# Patient Record
Sex: FEMALE | Race: WHITE | NOT HISPANIC OR LATINO | URBAN - METROPOLITAN AREA
[De-identification: names, ages, dates, MRNs, and addresses within clinical notes are randomized per-mention and may not be internally consistent; named-entity substitution may affect disease eponyms.]

---

## 2024-08-30 ENCOUNTER — HOSPITAL ENCOUNTER (EMERGENCY)
Facility: HOSPITAL | Age: 69
Discharge: HOME | End: 2024-08-30
Attending: EMERGENCY MEDICINE
Payer: COMMERCIAL

## 2024-08-30 ENCOUNTER — APPOINTMENT (OUTPATIENT)
Dept: CARDIOLOGY | Facility: HOSPITAL | Age: 69
End: 2024-08-30
Payer: COMMERCIAL

## 2024-08-30 ENCOUNTER — APPOINTMENT (OUTPATIENT)
Dept: RADIOLOGY | Facility: HOSPITAL | Age: 69
End: 2024-08-30
Payer: COMMERCIAL

## 2024-08-30 VITALS
WEIGHT: 160 LBS | DIASTOLIC BLOOD PRESSURE: 72 MMHG | TEMPERATURE: 98.7 F | HEART RATE: 56 BPM | OXYGEN SATURATION: 93 % | BODY MASS INDEX: 26.66 KG/M2 | RESPIRATION RATE: 15 BRPM | HEIGHT: 65 IN | SYSTOLIC BLOOD PRESSURE: 125 MMHG

## 2024-08-30 DIAGNOSIS — R07.9 CHEST PAIN, UNSPECIFIED TYPE: Primary | ICD-10-CM

## 2024-08-30 DIAGNOSIS — R05.1 ACUTE COUGH: ICD-10-CM

## 2024-08-30 DIAGNOSIS — U07.1 COVID-19: ICD-10-CM

## 2024-08-30 LAB
ALBUMIN SERPL-MCNC: 4 G/DL (ref 3.5–5)
ALP BLD-CCNC: 76 U/L (ref 35–125)
ALT SERPL-CCNC: 22 U/L (ref 5–40)
ANION GAP SERPL CALC-SCNC: 10 MMOL/L
AST SERPL-CCNC: 22 U/L (ref 5–40)
BASOPHILS # BLD MANUAL: 0.03 X10*3/UL (ref 0–0.1)
BASOPHILS NFR BLD MANUAL: 1 %
BILIRUB SERPL-MCNC: 0.7 MG/DL (ref 0.1–1.2)
BUN SERPL-MCNC: 18 MG/DL (ref 8–25)
CALCIUM SERPL-MCNC: 8.7 MG/DL (ref 8.5–10.4)
CHLORIDE SERPL-SCNC: 105 MMOL/L (ref 97–107)
CO2 SERPL-SCNC: 25 MMOL/L (ref 24–31)
CREAT SERPL-MCNC: 0.9 MG/DL (ref 0.4–1.6)
EGFRCR SERPLBLD CKD-EPI 2021: 69 ML/MIN/1.73M*2
EOSINOPHIL # BLD MANUAL: 0.13 X10*3/UL (ref 0–0.7)
EOSINOPHIL NFR BLD MANUAL: 4 %
ERYTHROCYTE [DISTWIDTH] IN BLOOD BY AUTOMATED COUNT: 12.2 % (ref 11.5–14.5)
GLUCOSE SERPL-MCNC: 87 MG/DL (ref 65–99)
HCT VFR BLD AUTO: 38.1 % (ref 36–46)
HGB BLD-MCNC: 12.3 G/DL (ref 12–16)
IMM GRANULOCYTES # BLD AUTO: 0 X10*3/UL (ref 0–0.7)
IMM GRANULOCYTES NFR BLD AUTO: 0 % (ref 0–0.9)
LYMPHOCYTES # BLD MANUAL: 1.6 X10*3/UL (ref 1.2–4.8)
LYMPHOCYTES NFR BLD MANUAL: 50 %
MCH RBC QN AUTO: 30.6 PG (ref 26–34)
MCHC RBC AUTO-ENTMCNC: 32.3 G/DL (ref 32–36)
MCV RBC AUTO: 95 FL (ref 80–100)
MONOCYTES # BLD MANUAL: 0.03 X10*3/UL (ref 0.1–1)
MONOCYTES NFR BLD MANUAL: 1 %
NEUTS SEG # BLD MANUAL: 0.96 X10*3/UL (ref 1.2–7)
NEUTS SEG NFR BLD MANUAL: 30 %
NRBC BLD-RTO: 0 /100 WBCS (ref 0–0)
NT-PROBNP SERPL-MCNC: 198 PG/ML (ref 0–353)
PLATELET # BLD AUTO: 263 X10*3/UL (ref 150–450)
POTASSIUM SERPL-SCNC: 3.9 MMOL/L (ref 3.4–5.1)
PROT SERPL-MCNC: 6.9 G/DL (ref 5.9–7.9)
RBC # BLD AUTO: 4.02 X10*6/UL (ref 4–5.2)
RBC MORPH BLD: ABNORMAL
SARS-COV-2 RNA RESP QL NAA+PROBE: DETECTED
SODIUM SERPL-SCNC: 140 MMOL/L (ref 133–145)
TOTAL CELLS COUNTED BLD: 100
TROPONIN T SERPL-MCNC: <6 NG/L
VARIANT LYMPHS # BLD MANUAL: 0.45 X10*3/UL (ref 0–0.5)
VARIANT LYMPHS NFR BLD: 14 %
WBC # BLD AUTO: 3.2 X10*3/UL (ref 4.4–11.3)

## 2024-08-30 PROCEDURE — 36415 COLL VENOUS BLD VENIPUNCTURE: CPT | Performed by: EMERGENCY MEDICINE

## 2024-08-30 PROCEDURE — 96375 TX/PRO/DX INJ NEW DRUG ADDON: CPT

## 2024-08-30 PROCEDURE — 87635 SARS-COV-2 COVID-19 AMP PRB: CPT | Performed by: EMERGENCY MEDICINE

## 2024-08-30 PROCEDURE — 2500000001 HC RX 250 WO HCPCS SELF ADMINISTERED DRUGS (ALT 637 FOR MEDICARE OP): Performed by: EMERGENCY MEDICINE

## 2024-08-30 PROCEDURE — 84484 ASSAY OF TROPONIN QUANT: CPT | Performed by: EMERGENCY MEDICINE

## 2024-08-30 PROCEDURE — 93005 ELECTROCARDIOGRAM TRACING: CPT

## 2024-08-30 PROCEDURE — 71045 X-RAY EXAM CHEST 1 VIEW: CPT

## 2024-08-30 PROCEDURE — 85007 BL SMEAR W/DIFF WBC COUNT: CPT | Performed by: EMERGENCY MEDICINE

## 2024-08-30 PROCEDURE — 96374 THER/PROPH/DIAG INJ IV PUSH: CPT

## 2024-08-30 PROCEDURE — 71045 X-RAY EXAM CHEST 1 VIEW: CPT | Performed by: RADIOLOGY

## 2024-08-30 PROCEDURE — 99284 EMERGENCY DEPT VISIT MOD MDM: CPT | Mod: 25

## 2024-08-30 PROCEDURE — 85027 COMPLETE CBC AUTOMATED: CPT | Performed by: EMERGENCY MEDICINE

## 2024-08-30 PROCEDURE — 84075 ASSAY ALKALINE PHOSPHATASE: CPT | Performed by: EMERGENCY MEDICINE

## 2024-08-30 PROCEDURE — 83880 ASSAY OF NATRIURETIC PEPTIDE: CPT | Performed by: EMERGENCY MEDICINE

## 2024-08-30 PROCEDURE — 2500000004 HC RX 250 GENERAL PHARMACY W/ HCPCS (ALT 636 FOR OP/ED): Performed by: EMERGENCY MEDICINE

## 2024-08-30 RX ORDER — ASPIRIN 325 MG
325 TABLET ORAL ONCE
Status: COMPLETED | OUTPATIENT
Start: 2024-08-30 | End: 2024-08-30

## 2024-08-30 RX ORDER — ALBUTEROL SULFATE 90 UG/1
2 INHALANT RESPIRATORY (INHALATION) EVERY 4 HOURS PRN
Qty: 18 G | Refills: 0 | Status: SHIPPED | OUTPATIENT
Start: 2024-08-30 | End: 2024-09-29

## 2024-08-30 RX ORDER — FAMOTIDINE 10 MG/ML
20 INJECTION INTRAVENOUS ONCE
Status: COMPLETED | OUTPATIENT
Start: 2024-08-30 | End: 2024-08-30

## 2024-08-30 RX ORDER — IBUPROFEN 600 MG/1
600 TABLET ORAL EVERY 6 HOURS PRN
Qty: 20 TABLET | Refills: 0 | Status: SHIPPED | OUTPATIENT
Start: 2024-08-30 | End: 2024-09-04

## 2024-08-30 RX ORDER — MORPHINE SULFATE 4 MG/ML
4 INJECTION, SOLUTION INTRAMUSCULAR; INTRAVENOUS ONCE
Status: DISCONTINUED | OUTPATIENT
Start: 2024-08-30 | End: 2024-08-30 | Stop reason: HOSPADM

## 2024-08-30 RX ORDER — ONDANSETRON HYDROCHLORIDE 2 MG/ML
4 INJECTION, SOLUTION INTRAVENOUS ONCE
Status: COMPLETED | OUTPATIENT
Start: 2024-08-30 | End: 2024-08-30

## 2024-08-30 ASSESSMENT — COLUMBIA-SUICIDE SEVERITY RATING SCALE - C-SSRS
6. HAVE YOU EVER DONE ANYTHING, STARTED TO DO ANYTHING, OR PREPARED TO DO ANYTHING TO END YOUR LIFE?: NO
2. HAVE YOU ACTUALLY HAD ANY THOUGHTS OF KILLING YOURSELF?: NO
1. IN THE PAST MONTH, HAVE YOU WISHED YOU WERE DEAD OR WISHED YOU COULD GO TO SLEEP AND NOT WAKE UP?: NO

## 2024-08-30 ASSESSMENT — PAIN DESCRIPTION - LOCATION: LOCATION: CHEST

## 2024-08-30 ASSESSMENT — PAIN SCALES - GENERAL
PAINLEVEL_OUTOF10: 5 - MODERATE PAIN
PAINLEVEL_OUTOF10: 0 - NO PAIN

## 2024-08-30 ASSESSMENT — PAIN - FUNCTIONAL ASSESSMENT
PAIN_FUNCTIONAL_ASSESSMENT: 0-10
PAIN_FUNCTIONAL_ASSESSMENT: 0-10

## 2024-08-30 ASSESSMENT — HEART SCORE
AGE: 65+
HISTORY: SLIGHTLY SUSPICIOUS
RISK FACTORS: NO KNOWN RISK FACTORS
HEART SCORE: 2
TROPONIN: LESS THAN OR EQUAL TO NORMAL LIMIT
ECG: NORMAL

## 2024-08-30 ASSESSMENT — PAIN DESCRIPTION - PAIN TYPE: TYPE: ACUTE PAIN

## 2024-08-30 ASSESSMENT — PAIN DESCRIPTION - DESCRIPTORS: DESCRIPTORS: TIGHTNESS

## 2024-08-30 NOTE — PROGRESS NOTES
Attestation/Supervisory note for AUGUSTINE Frias      The patient is a 69-year-old female presenting to the emergency department for evaluation of nonproductive cough, congestion, malaise fatigue and chest tightness.  She states that the chest tightness is in the center of her chest.  She states that she initially started having some cough, congestion and generalized malaise and fatigue about 1 week ago.  She started having the chest tightness about 5 days ago.  No specific better or worse.  No radiation.  She denies any headache or visual changes.  No focal weakness or numbness.  No significant shortness of breath.  No palpitations.  No diaphoresis.  No abdominal pain.  No nausea vomiting.  No diarrhea or constipation.  No urinary complaints.  She states her daughter was ill with similar symptoms before her symptoms started.  No other sick contacts or recent travel.  No history of CAD or ACS.  No history of PE or DVT.  No history of hypertension, hyperlipidemia or diabetes.  All pertinent positives and negatives are recorded above.  All other systems reviewed and otherwise negative.  Vital signs with mild bradycardia but otherwise within normal limits.  Physical exam with a well-nourished well-developed female in no acute distress.  HEENT exam within normal limits.  She has no evidence of airway compromise or respiratory distress.  Abdominal exam is benign.  She does not have any gross motor, neurologic or vascular deficits on exam.  Pulses are equal bilaterally.      EKG with sinus bradycardia at 55 bpm, normal axis, normal voltage, normal ST segment, normal T waves      Oral aspirin, IV Pepcid, IV morphine and IV Zofran ordered.      Diagnostic labs with mild leukopenia, positive COVID-19 test but otherwise unremarkable.      Heart score of 1      XR chest 1 view   Final Result   No active disease in the chest identified.        MACRO:   None        Signed by: Silvestre Xiong 8/30/2024 10:54 AM   Dictation workstation:    [id number]           The patient does not have any evidence of STEMI on EKG.  No events on telemetry.  No evidence of ischemia by diagnostic labs.  The patient does not have any evidence of airway compromise or respiratory distress on exam.  Chest x-ray without acute process.  No evidence of pneumonia or pneumothorax.  No evidence of CHF.  No widening of the mediastinum.  Pulses are equal bilaterally.  The patient does have a positive COVID-19 test and this likely explains her symptoms.      The patient was released in good condition.  She will follow-up with her primary care physician within 1 to 2 days for further management of her current symptoms.  She will return to the emergency department sooner with worsening of symptoms or onset of new symptoms.  Rx given for Paxlovid, ibuprofen and an albuterol inhaler.      Impression/diagnosis:  1.  COVID-19  2.  Nonproductive cough  3.  Malaise and fatigue  4.  Substernal chest pain  5.  Leukopenia, unspecified      I personally saw the patient and made/approve the management plan and take responsibility for the patient management.      I independently interpreted the following study (S) EKG and diagnostic labs      I personally discussed the patient's management with the patient      I reviewed the results of the diagnostic labs and diagnostic imaging.  Formal radiology read was completed by the radiologist.      Suzanne Roe MD

## 2024-08-30 NOTE — ED PROVIDER NOTES
HPI   Chief Complaint   Patient presents with    Chest Pain     Patient ambulatory to the ED from triage for a chief complaint of chest tightness ongoing for approximately 8-9 days. Patient reports a productive cough. No cardiac history. GCS 15.        Patient is a 69-year-old female presenting to the emergency department for evaluation of nonproductive cough, congestion, and chest tightness.  Patient states she has had the cough, congestion generalized fatigue for approximately 1 week.  She states her daughter is also sick with similar symptoms.  She states 5 days ago she started developing some chest tightness in the middle of her chest.  Nothing makes it better or worse.  She denies any cardiac history.  She denies any shortness of breath, fevers, chills, nausea, vomiting, abdominal pain, numbness, tingling, hematuria, dysuria, constipation, diarrhea.              Patient History   History reviewed. No pertinent past medical history.  History reviewed. No pertinent surgical history.  No family history on file.  Social History     Tobacco Use    Smoking status: Not on file    Smokeless tobacco: Not on file   Substance Use Topics    Alcohol use: Not on file    Drug use: Not on file       Physical Exam   ED Triage Vitals [08/30/24 1043]   Temperature Heart Rate Respirations BP   37.1 °C (98.7 °F) 56 15 125/72      Pulse Ox Temp Source Heart Rate Source Patient Position   (!) 93 % Oral Monitor --      BP Location FiO2 (%)     -- --       Physical Exam  Vitals and nursing note reviewed.   Constitutional:       General: She is not in acute distress.     Appearance: She is well-developed. She is not ill-appearing or toxic-appearing.   HENT:      Head: Normocephalic and atraumatic.   Cardiovascular:      Rate and Rhythm: Normal rate and regular rhythm.      Heart sounds: Normal heart sounds. No murmur heard.     No friction rub. No gallop.   Pulmonary:      Effort: Pulmonary effort is normal.      Breath sounds: No  decreased breath sounds, wheezing, rhonchi or rales.   Abdominal:      Palpations: Abdomen is soft.      Tenderness: There is no abdominal tenderness. There is no guarding or rebound.   Musculoskeletal:         General: Normal range of motion.      Cervical back: Normal range of motion.   Skin:     General: Skin is warm and dry.   Neurological:      General: No focal deficit present.      Mental Status: She is alert and oriented to person, place, and time.   Psychiatric:         Mood and Affect: Mood normal.         Behavior: Behavior normal.           ED Course & MDM   Diagnoses as of 08/30/24 1159   Chest pain, unspecified type   COVID-19   Acute cough                 No data recorded     [place] Coma Scale Score: 15 (08/30/24 1043 : Reva Pretty RN) HEART Score: 2 (08/30/24 1148 : Marija Frias PA-C)                         Medical Decision Making  **Disclaimer parts of this chart have been completed using voice recognition software. Please excuse any errors of transcription.     Patient seen in conjunction with attending physician .     HPI: Detailed above.    Exam: A medically appropriate exam performed, outlined above, given the known history and presentation.    History obtained from: Patient     EKG: Reviewed and interpreted by my attending physician    Labs/Diagnostics:  Labs Reviewed   CBC WITH AUTO DIFFERENTIAL - Abnormal       Result Value    WBC 3.2 (*)     nRBC 0.0      RBC 4.02      Hemoglobin 12.3      Hematocrit 38.1      MCV 95      MCH 30.6      MCHC 32.3      RDW 12.2      Platelets 263      Immature Granulocytes %, Automated 0.0      Immature Granulocytes Absolute, Automated 0.00      Narrative:     The previously reported component Neutrophils % is no longer being reported.  The previously reported component Lymphocytes % is no longer being reported.  The previously reported component Monocytes % is no longer being reported.  The previously   reported component Eosinophils  % is no longer being reported.  The previously reported component Basophils % is no longer being reported.  The previously reported component Absolute Neutrophils is no longer being reported.  The previously reported   component Absolute Lymphocytes is no longer being reported.  The previously reported component Absolute Monocytes is no longer being reported.  The previously reported component Absolute Eosinophils is no longer being reported.  The previously reported   component Absolute Basophils is no longer being reported.   SARS-COV-2 PCR - Abnormal    Coronavirus 2019, PCR Detected (*)     Narrative:     This assay has received FDA Emergency Use Authorization (EUA) and is only authorized for the duration of time that circumstances exist to justify the authorization of the emergency use of in vitro diagnostic tests for the detection of SARS-CoV-2 virus and/or diagnosis of COVID-19 infection under section 564(b)(1) of the Act, 21 U.S.C. 360bbb-3(b)(1). This assay is an in vitro diagnostic nucleic acid amplification test for the qualitative detection of SARS-CoV-2 from nasopharyngeal specimens and has been validated for use at [facility]. Negative results do not preclude COVID-19 infections and should not be used as the sole basis for diagnosis, treatment, or other management decisions.     MANUAL DIFFERENTIAL - Abnormal    Neutrophils %, Manual 30.0      Lymphocytes %, Manual 50.0      Monocytes %, Manual 1.0      Eosinophils %, Manual 4.0      Basophils %, Manual 1.0      Atypical Lymphocytes %, Manual 14.0      Seg Neutrophils Absolute, Manual 0.96 (*)     Lymphocytes Absolute, Manual 1.60      Monocytes Absolute, Manual 0.03 (*)     Eosinophils Absolute, Manual 0.13      Basophils Absolute, Manual 0.03      Atypical Lymphs Absolute, Manual 0.45      Total Cells Counted 100      RBC Morphology No significant RBC morphology present     N-TERMINAL PROBNP - Normal    PROBNP 198       Narrative:     Reference ranges are based on clinical submission data. These ranges represent the 95th percentile of normal cut-off points. As NT Pro- BNP values approach 1000 pg/ml, clinical symptoms are more likely associated with CHF.   COMPREHENSIVE METABOLIC PANEL - Normal    Glucose 87      Sodium 140      Potassium 3.9      Chloride 105      Bicarbonate 25      Urea Nitrogen 18      Creatinine 0.90      eGFR 69      Calcium 8.7      Albumin 4.0      Alkaline Phosphatase 76      Total Protein 6.9      AST 22      Bilirubin, Total 0.7      ALT 22      Anion Gap 10     SERIAL TROPONIN, INITIAL ([facility]) - Normal    Troponin T, High Sensitivity <6     TROPONIN T SERIES, HIGH SENSITIVITY (0, 2 HR, 6 HR)    Narrative:     The following orders were created for panel order Troponin T Series, High Sensitivity (0, 2HR, 6HR).  Procedure                               Abnormality         Status                     ---------                               -----------         ------                     Serial Troponin, Initial...[[id number]]  Normal              Final result               Serial Troponin, 2 Hour ...[[id number]]                                                   Please view results for these tests on the individual orders.   SERIAL TROPONIN,  2 HOUR ([facility])     XR chest 1 view   Final Result   No active disease in the chest identified.        MACRO:   None        Signed by: Silvestre Xiong 8/30/2024 10:54 AM   Dictation workstation:   [id number]        EMERGENCY DEPARTMENT COURSE and DIFFERENTIAL DIAGNOSIS/MDM:  Patient is a 69-year-old female presenting to the emergency department for evaluation of cough, congestion, and chest tightness.  On physical exam vital signs stable and patient is in no acute distress.  Lung sounds clear to auscultation bilaterally.  Patient is not tachycardic and is afebrile.  She has no cardiac history.  Diagnostic labs and imaging ordered as well as Pepcid, aspirin, Zofran, and morphine.   "CBC remarkable for leukopenia but no anemia.  Troponin less than 6 and given the patient has had the chest pain for 5 days do not feel repeat troponin is necessary.  CMP showed no electrolyte abnormalities.  proBNP normal.  Patient did test positive for COVID-19 likely causing the patient's symptoms.  Chest x-ray showed no active disease in the chest.  Suspect patient symptoms are likely due to her COVID-19.  Patient was discharged in stable condition.  She was advised to follow-up with primary care physician outpatient within the next 1 to 2 days.  She will return to the emergency department with any new or worsening symptoms.    I utilized an evidence-based risk rating tool (CMT) along with my training and experience to weigh the risk of discharge against the risks of further testing, imaging, or hospitalization. At this time I estimate the risks of additional testing, imaging, or hospitalization to be equal to or greater than the risk of discharge. I discussed my risk assessment with the patient and the patient consents to the risk of discharge as well as the risk of uncertainty in estimating outcomes.    The patient's HEART Score is <4. In rare cases, I give patients with HEART Score of 4 the option of discharge, but only when they meet criteria for \"Low 4,\" meaning that HST was used, and the 4 is not from a highly suspicious story, highly suspicious EKG, or positive cardiac enzymes. In these selected cases, the risk of a \"Low 4\" is still most likely lower than the risk of admission and further testing/imaging. [id number]         Vitals:    Vitals:    08/30/24 1043   BP: 125/72   Pulse: 56   Resp: 15   Temp: 37.1 °C (98.7 °F)   TempSrc: Oral   SpO2: (!) 93%   Weight: 72.6 kg (160 lb)   Height: 1.651 m (5' 5\")     History Limited by:    None    Independent history obtained from:    None    External records reviewed:    None    Diagnostics interpreted by me:    Xrays - see my independent interpretation in " MDM    Discussions with other clinicians:    None    Chronic conditions impacting care:    None    Social determinants of health affecting care:    None    Diagnostic tests considered but not performed: None    ED Medications managed:    Medications   morphine injection 4 mg (4 mg intravenous Not Given 8/30/24 1052)   famotidine PF (Pepcid) injection 20 mg (20 mg intravenous Given 8/30/24 1055)   ondansetron (Zofran) injection 4 mg (4 mg intravenous Given 8/30/24 1054)   aspirin tablet 325 mg (325 mg oral Given 8/30/24 1054)       Prescription drugs considered:    None    Screenings:     HEART Score: 2          Procedure  Procedures     Marija Frias PA-C  08/30/24 8568

## 2024-08-30 NOTE — Clinical Note
Zuly Polanco was seen and treated in our emergency department on 8/30/2024.  She may return to work on 09/03/2024.       If you have any questions or concerns, please don't hesitate to call.      Suzanne Reo MD

## 2024-09-03 LAB
ATRIAL RATE: 55 BPM
P AXIS: 38 DEGREES
P OFFSET: 193 MS
P ONSET: 139 MS
PR INTERVAL: 168 MS
Q ONSET: 223 MS
QRS COUNT: 9 BEATS
QRS DURATION: 78 MS
QT INTERVAL: 452 MS
QTC CALCULATION(BAZETT): 432 MS
QTC FREDERICIA: 439 MS
R AXIS: -11 DEGREES
T AXIS: 20 DEGREES
T OFFSET: 449 MS
VENTRICULAR RATE: 55 BPM

## 2025-05-16 ENCOUNTER — APPOINTMENT (OUTPATIENT)
Dept: OTOLARYNGOLOGY | Facility: CLINIC | Age: 70
End: 2025-05-16
Payer: COMMERCIAL

## 2025-05-16 DIAGNOSIS — H91.91 DECREASED HEARING OF RIGHT EAR: Primary | ICD-10-CM

## 2025-05-16 DIAGNOSIS — H61.23 BILATERAL IMPACTED CERUMEN: ICD-10-CM

## 2025-05-16 ASSESSMENT — ENCOUNTER SYMPTOMS
OCCASIONAL FEELINGS OF UNSTEADINESS: 0
LOSS OF SENSATION IN FEET: 0
DEPRESSION: 0

## 2025-05-16 NOTE — PROGRESS NOTES
"History Of Present Illness  Zuly Polanco is a 70 y.o. female presenting with: \"Stopped up Right ear  \".  She is kindly referred by Dr. Cummins.     She has decreased hearing in her right ear for the past few months. She cannot remember if it has happened suddenly or not.   Tinnitus (+), for decades  Dizziness : occasionally off balance little bit.  Dry feels like the mucus on the backside of the posterior chest.      On examination, there was wax buildup in ear canals bilaterally more on the right side.  Cleaning was done.  Tympanic membranes were intact bilaterally.    Plan  1-hearing test, follow-up after the test     Past Medical History  She has no past medical history on file.    Surgical History  She has no past surgical history on file.     Social History  She has no history on file for tobacco use, alcohol use, and drug use.    Family History  Family History[1]     Allergies  Sulfamethoxazole-trimethoprim    Review of Systems   Difficulty hearing  Vertigo, at times  Tinnitus, always  Ears feel full  Sinus pressure  Snoring  Postnasal drip  Hoarseness     Physical Exam    General appearance: Healthy-appearing, well-nourished, well groomed, in no acute distress.     Head and Face: Atraumatic with no masses, lesions, or scarring.      Salivary glands: No tenderness of the parotid glands or parotid masses.     No tenderness of the submandibular glands or submandibular masses.      Facial strength: Normal strength and symmetry, no synkinesis or facial tic.     Eyes: Conjunctivas look non-hyperemic bilaterally    Ears: Bilaterally wax was cleaned, ear canals look normal. Tympanic membranes look intact, no hyperemia, fluid or retraction. Hearing grossly normal.      Nose: Mucosa looks normal. No purulent discharge. Septum essentially straight.     Oral Cavity/Mouth: Lips and tongue look normal.     Throat: No postnasal discharge. No tonsil hypertrophy. No hyperemia.    Neck: Symmetrical, trachea midline. " "    Pulmonary: Normal respiratory effort.     Lymphatic: No palpable pathologic lymph nodes at neck.     Neurological/Psychiatric Orientation to person, place, and time: Normal.     Mood and affect: Normal.      Extremities: No clubbing.     Skin: No significant skin lesions were noted at face or neck        Procedure    EAR WAX REMOVAL 05.16.2025  Patient had bilateral ear wax. Using small instrument(s) and/or suction cleaning was done. Patient tolerated the procedure well.        Last Recorded Vitals  There were no vitals taken for this visit.    Relevant Results    Assessment and Plan:  Zuly Polanco is a 70 y.o. female presenting with: \"Stopped up Right ear  \".  She is kindly referred by Dr. Cummins.     She has decreased hearing in her right ear for the past few months. She cannot remember if it has happened suddenly or not.   Tinnitus (+), for decades  Dizziness : occasionally off balance little bit.  Dry feels like the mucus on the backside of the posterior chest.      On examination, there was wax buildup in ear canals bilaterally more on the right side.  Cleaning was done.  Tympanic membranes were intact bilaterally.    Plan  1-hearing test, follow-up after the test       Ivonne Lance  Otolaryngology - Head & Neck Surgery         [1] No family history on file.    "

## 2025-07-01 ENCOUNTER — APPOINTMENT (OUTPATIENT)
Dept: OTOLARYNGOLOGY | Facility: CLINIC | Age: 70
End: 2025-07-01
Payer: COMMERCIAL

## 2025-07-01 ENCOUNTER — APPOINTMENT (OUTPATIENT)
Dept: AUDIOLOGY | Facility: CLINIC | Age: 70
End: 2025-07-01
Payer: COMMERCIAL

## 2025-07-01 DIAGNOSIS — H91.13 PRESBYCUSIS OF BOTH EARS: Primary | ICD-10-CM

## 2025-07-01 DIAGNOSIS — H90.3 SENSORINEURAL HEARING LOSS (SNHL) OF BOTH EARS: Primary | ICD-10-CM

## 2025-07-01 DIAGNOSIS — H93.13 TINNITUS OF BOTH EARS: ICD-10-CM

## 2025-07-01 PROCEDURE — 99212 OFFICE O/P EST SF 10 MIN: CPT | Performed by: OTOLARYNGOLOGY

## 2025-07-01 PROCEDURE — 92550 TYMPANOMETRY & REFLEX THRESH: CPT | Performed by: AUDIOLOGIST

## 2025-07-01 PROCEDURE — 92557 COMPREHENSIVE HEARING TEST: CPT | Performed by: AUDIOLOGIST

## 2025-07-01 ASSESSMENT — PAIN SCALES - GENERAL: PAINLEVEL_OUTOF10: 0 - NO PAIN

## 2025-07-01 ASSESSMENT — PAIN - FUNCTIONAL ASSESSMENT: PAIN_FUNCTIONAL_ASSESSMENT: 0-10

## 2025-07-01 NOTE — PROGRESS NOTES
"History Of Present Illness    07.01.2025 Hearing test shows bilateral presbycusis.    Recommendation  1-consider using hearing aids  2-follow-up in 1 year  _________________________________________________________________    Zuly Polanco is a 70 y.o. female presenting with: \"Stopped up Right ear  \".  She is kindly referred by Dr. Cummins.     She has decreased hearing in her right ear for the past few months. She cannot remember if it has happened suddenly or not.   Tinnitus (+), for decades  Dizziness : occasionally off balance little bit.  Dry feels like the mucus on the backside of the posterior chest.      On examination, there was wax buildup in ear canals bilaterally more on the right side.  Cleaning was done.  Tympanic membranes were intact bilaterally.    Plan  1-hearing test, follow-up after the test     Past Medical History  She has no past medical history on file.    Surgical History  She has no past surgical history on file.     Social History  She has no history on file for tobacco use, alcohol use, and drug use.    Family History  Family History[1]     Allergies  Sulfamethoxazole-trimethoprim    Review of Systems (initial ROS)  Difficulty hearing  Vertigo, at times  Tinnitus, always  Ears feel full  Sinus pressure  Snoring  Postnasal drip  Hoarseness     Physical Exam (initial exam)    General appearance: Healthy-appearing, well-nourished, well groomed, in no acute distress.     Head and Face: Atraumatic with no masses, lesions, or scarring.      Salivary glands: No tenderness of the parotid glands or parotid masses.     No tenderness of the submandibular glands or submandibular masses.      Facial strength: Normal strength and symmetry, no synkinesis or facial tic.     Eyes: Conjunctivas look non-hyperemic bilaterally    Ears: Bilaterally wax was cleaned, ear canals look normal. Tympanic membranes look intact, no hyperemia, fluid or retraction. Hearing grossly normal.      Nose: Mucosa looks normal. " "No purulent discharge. Septum essentially straight.     Oral Cavity/Mouth: Lips and tongue look normal.     Throat: No postnasal discharge. No tonsil hypertrophy. No hyperemia.    Neck: Symmetrical, trachea midline.     Pulmonary: Normal respiratory effort.     Lymphatic: No palpable pathologic lymph nodes at neck.     Neurological/Psychiatric Orientation to person, place, and time: Normal.     Mood and affect: Normal.      Extremities: No clubbing.     Skin: No significant skin lesions were noted at face or neck        Procedure    EAR WAX REMOVAL 05.16.2025  Patient had bilateral ear wax. Using small instrument(s) and/or suction cleaning was done. Patient tolerated the procedure well.        Last Recorded Vitals  There were no vitals taken for this visit.    Relevant Results    Assessment and Plan:    07.01.2025 Hearing test shows bilateral presbycusis.    Recommendation  1-consider using hearing aids  2-follow-up in 1 year  _________________________________________________________________    05.16.2025: Zuly Polanco is a 70 y.o. female presenting with: \"Stopped up Right ear  \".  She is kindly referred by Dr. Cummins.     She has decreased hearing in her right ear for the past few months. She cannot remember if it has happened suddenly or not.   Tinnitus (+), for decades  Dizziness : occasionally off balance little bit.  Dry feels like the mucus on the backside of the posterior chest.      On examination, there was wax buildup in ear canals bilaterally more on the right side.  Cleaning was done.  Tympanic membranes were intact bilaterally.    Plan  1-hearing test, follow-up after the test       Ivonne Lance  Otolaryngology - Head & Neck Surgery         [1] No family history on file.    "

## 2025-07-01 NOTE — PROGRESS NOTES
Zuly Polanco, age 70 years, is here today for a hearing evaluation.     Difficulty hearing - yes, both ears for the past several years  Tinnitus - yes, both ears for many years  Excessive noise exposure - no  Chronic ear infections - no  Ear pain - no  Ear drainage - no  Past ear surgery - no  Vertigo - no  Past hearing aid use - no  Family history - no    Appointment time: 11-11:45    Otoscopy revealed clear ear canals with visual inspection of the tympanic membranes bilaterally.    Behavioral hearing evaluation:  Right ear - normal hearing sensitivity 125-500 Hz sloping to mild to moderately-severe sensorineural hearing loss 4518-5745 Hz  Left ear - normal hearing sensitivity 125-500 Hz sloping to mild to severe sensorineural hearing loss 0937-2197 Hz    Speech reception thresholds obtained at a level consistent with pure tone thresholds bilaterally.    Word discrimination:  Right ear - excellent (96%)  Left ear - excellent (96%)    Tympanometry:  Right ear - Type A, normal middle ear function  Left ear - Type A, normal middle ear function    Ipsilateral acoustic reflexes:  Probe right - present 500-4000 Hz  Probe left - present 500-4000 Hz    Contralateral acoustic reflexes:  Probe right - present 500-4000 Hz  Probe left - present 500-4000 Hz  The presence of acoustic reflexes within normal intensity limits is consistent with normal middle ear and brainstem function, and suggests that auditory sensitivity is not significantly impaired.     Recommendations:  1) Consider hearing aids for both ears to provide sound and help mask tinnitus  2) Re-evaluate hearing annually, to monitor, or sooner if a change in hearing is noted